# Patient Record
Sex: MALE | Race: WHITE
[De-identification: names, ages, dates, MRNs, and addresses within clinical notes are randomized per-mention and may not be internally consistent; named-entity substitution may affect disease eponyms.]

---

## 2018-08-13 ENCOUNTER — HOSPITAL ENCOUNTER (OUTPATIENT)
Dept: HOSPITAL 62 - ER | Age: 48
Setting detail: OBSERVATION
LOS: 3 days | Discharge: HOME | End: 2018-08-16
Attending: INTERNAL MEDICINE | Admitting: INTERNAL MEDICINE
Payer: MEDICARE

## 2018-08-13 DIAGNOSIS — G89.18: ICD-10-CM

## 2018-08-13 DIAGNOSIS — E66.9: ICD-10-CM

## 2018-08-13 DIAGNOSIS — R07.9: Primary | ICD-10-CM

## 2018-08-13 DIAGNOSIS — Z79.82: ICD-10-CM

## 2018-08-13 DIAGNOSIS — F41.9: ICD-10-CM

## 2018-08-13 DIAGNOSIS — Z91.19: ICD-10-CM

## 2018-08-13 DIAGNOSIS — Z95.5: ICD-10-CM

## 2018-08-13 DIAGNOSIS — R01.1: ICD-10-CM

## 2018-08-13 DIAGNOSIS — Z73.3: ICD-10-CM

## 2018-08-13 DIAGNOSIS — Z82.49: ICD-10-CM

## 2018-08-13 DIAGNOSIS — E78.5: ICD-10-CM

## 2018-08-13 DIAGNOSIS — M25.531: ICD-10-CM

## 2018-08-13 DIAGNOSIS — I10: ICD-10-CM

## 2018-08-13 DIAGNOSIS — I25.110: ICD-10-CM

## 2018-08-13 DIAGNOSIS — I25.2: ICD-10-CM

## 2018-08-13 DIAGNOSIS — Z79.899: ICD-10-CM

## 2018-08-13 DIAGNOSIS — F17.200: ICD-10-CM

## 2018-08-13 DIAGNOSIS — Z79.02: ICD-10-CM

## 2018-08-13 LAB
ADD MANUAL DIFF: NO
ALBUMIN SERPL-MCNC: 3.7 G/DL (ref 3.5–5)
ALP SERPL-CCNC: 79 U/L (ref 38–126)
ALT SERPL-CCNC: 37 U/L (ref 21–72)
ANION GAP SERPL CALC-SCNC: 12 MMOL/L (ref 5–19)
AST SERPL-CCNC: 19 U/L (ref 17–59)
BASOPHILS # BLD AUTO: 0.1 10^3/UL (ref 0–0.2)
BASOPHILS NFR BLD AUTO: 0.8 % (ref 0–2)
BILIRUB DIRECT SERPL-MCNC: 0.2 MG/DL (ref 0–0.4)
BILIRUB SERPL-MCNC: 0.5 MG/DL (ref 0.2–1.3)
BUN SERPL-MCNC: 15 MG/DL (ref 7–20)
CALCIUM: 9 MG/DL (ref 8.4–10.2)
CHLORIDE SERPL-SCNC: 105 MMOL/L (ref 98–107)
CO2 SERPL-SCNC: 25 MMOL/L (ref 22–30)
EOSINOPHIL # BLD AUTO: 0.3 10^3/UL (ref 0–0.6)
EOSINOPHIL NFR BLD AUTO: 3.3 % (ref 0–6)
ERYTHROCYTE [DISTWIDTH] IN BLOOD BY AUTOMATED COUNT: 13.8 % (ref 11.5–14)
GLUCOSE SERPL-MCNC: 106 MG/DL (ref 75–110)
HCT VFR BLD CALC: 44.1 % (ref 37.9–51)
HGB BLD-MCNC: 15.1 G/DL (ref 13.5–17)
LYMPHOCYTES # BLD AUTO: 2.6 10^3/UL (ref 0.5–4.7)
LYMPHOCYTES NFR BLD AUTO: 24.9 % (ref 13–45)
MCH RBC QN AUTO: 32.2 PG (ref 27–33.4)
MCHC RBC AUTO-ENTMCNC: 34.3 G/DL (ref 32–36)
MCV RBC AUTO: 94 FL (ref 80–97)
MONOCYTES # BLD AUTO: 1 10^3/UL (ref 0.1–1.4)
MONOCYTES NFR BLD AUTO: 9.2 % (ref 3–13)
NEUTROPHILS # BLD AUTO: 6.5 10^3/UL (ref 1.7–8.2)
NEUTS SEG NFR BLD AUTO: 61.8 % (ref 42–78)
PLATELET # BLD: 275 10^3/UL (ref 150–450)
POTASSIUM SERPL-SCNC: 3.8 MMOL/L (ref 3.6–5)
PROT SERPL-MCNC: 6.4 G/DL (ref 6.3–8.2)
RBC # BLD AUTO: 4.7 10^6/UL (ref 4.35–5.55)
SODIUM SERPL-SCNC: 141.6 MMOL/L (ref 137–145)
TOTAL CELLS COUNTED % (AUTO): 100 %
WBC # BLD AUTO: 10.5 10^3/UL (ref 4–10.5)

## 2018-08-13 PROCEDURE — 99285 EMERGENCY DEPT VISIT HI MDM: CPT

## 2018-08-13 PROCEDURE — 84484 ASSAY OF TROPONIN QUANT: CPT

## 2018-08-13 PROCEDURE — 36415 COLL VENOUS BLD VENIPUNCTURE: CPT

## 2018-08-13 PROCEDURE — G0378 HOSPITAL OBSERVATION PER HR: HCPCS

## 2018-08-13 PROCEDURE — 85025 COMPLETE CBC W/AUTO DIFF WBC: CPT

## 2018-08-13 PROCEDURE — 93005 ELECTROCARDIOGRAM TRACING: CPT

## 2018-08-13 PROCEDURE — 80053 COMPREHEN METABOLIC PANEL: CPT

## 2018-08-13 PROCEDURE — 76937 US GUIDE VASCULAR ACCESS: CPT

## 2018-08-13 PROCEDURE — 71045 X-RAY EXAM CHEST 1 VIEW: CPT

## 2018-08-13 PROCEDURE — 93010 ELECTROCARDIOGRAM REPORT: CPT

## 2018-08-13 PROCEDURE — 93458 L HRT ARTERY/VENTRICLE ANGIO: CPT

## 2018-08-13 RX ADMIN — SIMVASTATIN SCH MG: 10 TABLET, FILM COATED ORAL at 23:00

## 2018-08-13 RX ADMIN — HEPARIN SODIUM SCH UNIT: 5000 INJECTION, SOLUTION INTRAVENOUS; SUBCUTANEOUS at 23:01

## 2018-08-13 RX ADMIN — Medication SCH ML: at 23:01

## 2018-08-13 NOTE — EKG REPORT
SEVERITY:- ABNORMAL ECG -

SINUS RHYTHM

PROBABLE INFERIOR INFARCT, AGE INDETERMINATE

:

Confirmed by: Joshua Herron MD 13-Aug-2018 20:30:50

## 2018-08-13 NOTE — PDOC H&P
History of Present Illness


Admission Date/PCP: 


  08/13/18 17:38





  





Patient complains of: Chest Pain


History of Present Illness: 


ELIZABETH RECIO is a 48 year old male


Patient presents to the emergency room with complaints of chest pain.  Please 

note patient was very rude and uncooperative and the extent of my interview 

with him was limited.  Review of chart states that he has a history of coronary 

artery disease and 9 prior stents with the last one being one year ago.  Is 

currently chest pain-free





Past Medical History


Cardiac Medical History: Reports: Myocardial Infarction, Hypertension





Social History


Information Source: [facility] Records


Smoking Status: Never Smoker





- Advance Directive


Resuscitation Status: Full Code





Family History


Family History: Reviewed & Not Pertinent


Parental Family History Reviewed: No


Children Family History Reviewed: No


Sibling(s) Family History Reviewed.: No





Medication/Allergy


Home Medications: 








Aspirin [Aspirin EC] 81 mg PO DAILY 08/13/18 


Clopidogrel Bisulfate [Plavix 75 mg Tablet] 75 mg PO DAILY 08/13/18 


Isosorbide Mononitrate [Isosorbide Mononitrate ER] 30 mg PO DAILY 08/13/18 


Lisinopril [Prinivil] 20 mg PO DAILY 08/13/18 


Metoprolol Tartrate [Lopressor 25 mg Tablet] 25 mg PO DAILY 08/13/18 


Multivit-Mins/Iron/Folic/Lycop [Centrum Men's Tablet] 1 each PO DAILY 08/13/18 


Simvastatin [Zocor 20 mg Tablet] 20 mg PO QHS 08/13/18 








Allergies/Adverse Reactions: 


 





No Known Allergies Allergy (Unverified 08/13/18 18:17)


 











Review of Systems


ROS unobtainable: Other - Limited due to patient being very uncooperative and 

rude





Physical Exam


Vital Signs: 


 











Temp Pulse Resp BP Pulse Ox


 


 98.9 F      23 H  140/89 H  98 


 


 08/13/18 13:47     08/13/18 17:31  08/13/18 17:31  08/13/18 17:31











General appearance: PRESENT: no acute distress, well-developed, well-nourished


Head exam: PRESENT: atraumatic, normocephalic


Eye exam: PRESENT: conjunctiva pink, EOMI, PERRLA.  ABSENT: scleral icterus


Ear exam: PRESENT: normal external ear exam


Mouth exam: PRESENT: moist, tongue midline


Neck exam: ABSENT: carotid bruit, JVD, lymphadenopathy, thyromegaly


Respiratory exam: PRESENT: clear to auscultation sosa.  ABSENT: rales, rhonchi, 

wheezes


Cardiovascular exam: PRESENT: RRR.  ABSENT: diastolic murmur, rubs, systolic 

murmur


Pulses: PRESENT: normal dorsalis pedis pul


Vascular exam: PRESENT: normal capillary refill


GI/Abdominal exam: PRESENT: normal bowel sounds, soft.  ABSENT: distended, 

guarding, mass, organolmegaly, rebound, tenderness


Rectal exam: PRESENT: deferred


Extremities exam: PRESENT: full ROM.  ABSENT: calf tenderness, clubbing, pedal 

edema


Neurological exam: PRESENT: alert, awake, oriented to person, oriented to place

, oriented to time, oriented to situation, CN II-XII grossly intact.  ABSENT: 

motor sensory deficit


Psychiatric exam: PRESENT: appropriate affect, normal mood.  ABSENT: homicidal 

ideation, suicidal ideation


Skin exam: PRESENT: dry, intact, warm.  ABSENT: cyanosis, rash





Results


Laboratory Results: 





 





 08/13/18 13:35 





 08/13/18 15:52 





 











MCV  94 fl (80-97)   08/13/18  13:35    


 


MCH  32.2 pg (27.0-33.4)   08/13/18  13:35    


 


MCHC  34.3 g/dL (32.0-36.0)   08/13/18  13:35    


 


RDW  13.8 % (11.5-14.0)   08/13/18  13:35    


 


Seg Neutrophils %  61.8 % (42-78)   08/13/18  13:35    


 


Lymphocytes %  24.9 % (13-45)   08/13/18  13:35    


 


Monocytes %  9.2 % (3-13)   08/13/18  13:35    


 


Eosinophils %  3.3 % (0-6)   08/13/18  13:35    


 


Basophils %  0.8 % (0-2)   08/13/18  13:35    


 


Absolute Neutrophils  6.5 10^3/uL (1.7-8.2)   08/13/18  13:35    


 


Absolute Lymphocytes  2.6 10^3/uL (0.5-4.7)   08/13/18  13:35    


 


Absolute Monocytes  1.0 10^3/uL (0.1-1.4)   08/13/18  13:35    


 


Absolute Eosinophils  0.3 10^3/uL (0.0-0.6)   08/13/18  13:35    


 


Absolute Basophils  0.1 10^3/uL (0.0-0.2)   08/13/18  13:35    


 


Chloride  105 mmol/L ()   08/13/18  15:52    


 


Carbon Dioxide  25 mmol/L (22-30)   08/13/18  15:52    


 


Anion Gap  12  (5-19)   08/13/18  15:52    


 


Est GFR ( Amer)  > 60  (>60)   08/13/18  15:52    


 


Est GFR (Non-Af Amer)  > 60  (>60)   08/13/18  15:52    


 


Glucose  106 mg/dL ()   08/13/18  15:52    


 


Calcium  9.0 mg/dL (8.4-10.2)   08/13/18  15:52    


 


Total Bilirubin  0.5 mg/dL (0.2-1.3)   08/13/18  15:52    


 


AST  19 U/L (17-59)   08/13/18  15:52    


 


ALT  37 U/L (21-72)   08/13/18  15:52    


 


Alkaline Phosphatase  79 U/L ()   08/13/18  15:52    


 


Total Protein  6.4 g/dL (6.3-8.2)   08/13/18  15:52    


 


Albumin  3.7 g/dL (3.5-5.0)   08/13/18  15:52    








 











  08/13/18 08/13/18





  13:35 15:52


 


Troponin I  Cancelled  < 0.012











Impressions: 


 





Chest X-Ray  08/13/18 14:14


IMPRESSION:  NO ACUTE RADIOGRAPHIC FINDING IN THE CHEST.


 














Assessment & Plan





- Diagnosis


(1) Chest pain


Qualifiers: 


   Chest pain type: unspecified   Qualified Code(s): R07.9 - Chest pain, 

unspecified   





- Time


Time Spent: 30 to 50 Minutes


Medications reviewed and adjusted accordingly: Yes


Anticipated discharge: Home


Within: within 24 hours





- Inpatient Certification


Based on my medical assessment, after consideration of the patient's 

comorbidities, presenting symptoms, or acuity I expect that the services needed 

warrant INPATIENT care.: Yes


Medical Necessity: Need For Continuous Telemetry Monitoring





- Plan Summary


Plan Summary: 





We will schedule for stress test in a.m.
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
## 2018-08-13 NOTE — RADIOLOGY REPORT (SQ)
EXAM DESCRIPTION:  CHEST SINGLE VIEW



COMPLETED DATE/TIME:  8/13/2018 2:33 pm



REASON FOR STUDY:  chest pain



COMPARISON:  None.



EXAM PARAMETERS:  NUMBER OF VIEWS: One view.

TECHNIQUE: Single frontal radiographic view of the chest acquired.

RADIATION DOSE: NA

LIMITATIONS: None.



FINDINGS:  LUNGS AND PLEURA: No opacities, masses or pneumothorax. No pleural effusion.

MEDIASTINUM AND HILAR STRUCTURES: No masses.  Contour normal.

HEART AND VASCULAR STRUCTURES: Heart normal in size.  Normal vasculature.

BONES: No acute findings.

HARDWARE: None in the chest.

OTHER: No other significant finding.



IMPRESSION:  NO ACUTE RADIOGRAPHIC FINDING IN THE CHEST.



TECHNICAL DOCUMENTATION:  JOB ID:  [id number]

 2011 [employer]- All Rights Reserved



Reading location - IP/workstation name: [email or web address]

## 2018-08-14 RX ADMIN — Medication SCH ML: at 21:35

## 2018-08-14 RX ADMIN — HEPARIN SODIUM SCH: 5000 INJECTION, SOLUTION INTRAVENOUS; SUBCUTANEOUS at 21:35

## 2018-08-14 RX ADMIN — MORPHINE SULFATE PRN MG: 10 INJECTION INTRAMUSCULAR; INTRAVENOUS; SUBCUTANEOUS at 20:04

## 2018-08-14 RX ADMIN — HEPARIN SODIUM SCH: 5000 INJECTION, SOLUTION INTRAVENOUS; SUBCUTANEOUS at 07:20

## 2018-08-14 RX ADMIN — ISOSORBIDE MONONITRATE SCH MG: 30 TABLET, EXTENDED RELEASE ORAL at 10:28

## 2018-08-14 RX ADMIN — MORPHINE SULFATE PRN MG: 10 INJECTION INTRAMUSCULAR; INTRAVENOUS; SUBCUTANEOUS at 00:25

## 2018-08-14 RX ADMIN — MORPHINE SULFATE PRN MG: 10 INJECTION INTRAMUSCULAR; INTRAVENOUS; SUBCUTANEOUS at 17:29

## 2018-08-14 RX ADMIN — GUAIFENESIN SCH MG: 600 TABLET, EXTENDED RELEASE ORAL at 16:50

## 2018-08-14 RX ADMIN — HEPARIN SODIUM SCH: 5000 INJECTION, SOLUTION INTRAVENOUS; SUBCUTANEOUS at 15:27

## 2018-08-14 RX ADMIN — METOPROLOL TARTRATE SCH MG: 25 TABLET, FILM COATED ORAL at 10:27

## 2018-08-14 RX ADMIN — Medication SCH: at 07:20

## 2018-08-14 RX ADMIN — SIMVASTATIN SCH MG: 10 TABLET, FILM COATED ORAL at 21:33

## 2018-08-14 RX ADMIN — CLOPIDOGREL BISULFATE SCH MG: 75 TABLET, FILM COATED ORAL at 10:28

## 2018-08-14 RX ADMIN — MULTIVITAMIN TABLET SCH TAB: TABLET at 10:28

## 2018-08-14 RX ADMIN — ASPIRIN SCH MG: 81 TABLET, COATED ORAL at 10:28

## 2018-08-14 RX ADMIN — Medication SCH ML: at 16:50

## 2018-08-14 RX ADMIN — LISINOPRIL SCH MG: 10 TABLET ORAL at 10:27

## 2018-08-14 NOTE — PDOC PROGRESS REPORT
Subjective


Progress Note for:: 08/14/18


Subjective:: 


Patient was admitted for chest pain to rule out ACS.  No acute event overnight.

  Patient continued to report persistent chest pain.  However upon entering 

patient's room, patient was snoring and was noted to be sleeping.  When he 

wakes up he complained of severe chest pain rating it as 8/10 in intensity 

despite appearing comfortable.  Vision was scheduled for a chemical stress test 

this morning.  However go for a chemical stress test and said that he felt 

horrible last time he had a chemical stress test.  He did say he is able to 

area on and tolerate jogging and would prefer a treadmill stress test.





Reason For Visit: 


CHEST PAIN








Physical Exam


Vital Signs: 


 











Temp Pulse Resp BP Pulse Ox


 


 98.6 F   72   16   106/64   96 


 


 08/14/18 15:08  08/14/18 15:08  08/14/18 15:08  08/14/18 15:08  08/14/18 15:08








 Intake & Output











 08/13/18 08/14/18 08/15/18





 06:59 06:59 06:59


 


Intake Total   680


 


Balance   680


 


Weight  203 lb 0.732 oz 











General appearance: PRESENT: no acute distress, well-developed, well-nourished


Head exam: PRESENT: atraumatic, normocephalic


Eye exam: PRESENT: conjunctiva pink, EOMI, PERRLA.  ABSENT: scleral icterus


Mouth exam: PRESENT: moist, tongue midline


Respiratory exam: PRESENT: clear to auscultation sosa.  ABSENT: rales, rhonchi, 

wheezes


Cardiovascular exam: PRESENT: RRR.  ABSENT: diastolic murmur, rubs, systolic 

murmur


Vascular exam: PRESENT: normal capillary refill


GI/Abdominal exam: PRESENT: normal bowel sounds, soft.  ABSENT: distended, 

guarding, mass, organolmegaly, rebound, tenderness


Extremities exam: PRESENT: full ROM.  ABSENT: calf tenderness, clubbing, pedal 

edema


Musculoskeletal exam: PRESENT: ambulatory





Results


Laboratory Results: 


 











  08/13/18 08/14/18





  21:15 04:12


 


Troponin I  < 0.012  < 0.012











Impressions: 


 





Chest X-Ray  08/13/18 14:14


IMPRESSION:  NO ACUTE RADIOGRAPHIC FINDING IN THE CHEST.


 














Assessment & Plan





- Diagnosis


(1) Chest pain


Qualifiers: 


   Chest pain type: unspecified   Qualified Code(s): R07.9 - Chest pain, 

unspecified   


Is this a current diagnosis for this admission?: Yes   


Plan: 


EKGs and troponins have been unremarkable.  Cardiology was consulted yesterday 

on admission.  Patient will be rescheduled instead for treadmill stress test.








- Time


Time Spent with patient: 15-24 minutes

## 2018-08-14 NOTE — EKG REPORT
SEVERITY:- ABNORMAL ECG -

SINUS RHYTHM

PROBABLE INFERIOR INFARCT, AGE INDETERMINATE

:

Confirmed by: Joshua Herron MD 14-Aug-2018 07:32:55

## 2018-08-15 PROCEDURE — B2011ZZ PLAIN RADIOGRAPHY OF MULTIPLE CORONARY ARTERIES USING LOW OSMOLAR CONTRAST: ICD-10-PCS | Performed by: INTERNAL MEDICINE

## 2018-08-15 PROCEDURE — 4A023N7 MEASUREMENT OF CARDIAC SAMPLING AND PRESSURE, LEFT HEART, PERCUTANEOUS APPROACH: ICD-10-PCS | Performed by: INTERNAL MEDICINE

## 2018-08-15 PROCEDURE — B2051ZZ PLAIN RADIOGRAPHY OF LEFT HEART USING LOW OSMOLAR CONTRAST: ICD-10-PCS | Performed by: INTERNAL MEDICINE

## 2018-08-15 RX ADMIN — HEPARIN SODIUM SCH: 5000 INJECTION, SOLUTION INTRAVENOUS; SUBCUTANEOUS at 05:24

## 2018-08-15 RX ADMIN — Medication SCH ML: at 22:43

## 2018-08-15 RX ADMIN — SIMVASTATIN SCH MG: 10 TABLET, FILM COATED ORAL at 22:43

## 2018-08-15 RX ADMIN — Medication SCH: at 05:24

## 2018-08-15 RX ADMIN — RANOLAZINE SCH MG: 500 TABLET, FILM COATED, EXTENDED RELEASE ORAL at 22:46

## 2018-08-15 RX ADMIN — LISINOPRIL SCH: 10 TABLET ORAL at 10:38

## 2018-08-15 RX ADMIN — ASPIRIN SCH MG: 81 TABLET, COATED ORAL at 10:58

## 2018-08-15 RX ADMIN — Medication SCH: at 15:22

## 2018-08-15 RX ADMIN — MORPHINE SULFATE PRN MG: 10 INJECTION INTRAMUSCULAR; INTRAVENOUS; SUBCUTANEOUS at 23:16

## 2018-08-15 RX ADMIN — MULTIVITAMIN TABLET SCH: TABLET at 10:39

## 2018-08-15 RX ADMIN — HEPARIN SODIUM SCH: 5000 INJECTION, SOLUTION INTRAVENOUS; SUBCUTANEOUS at 22:42

## 2018-08-15 RX ADMIN — METOPROLOL TARTRATE SCH MG: 25 TABLET, FILM COATED ORAL at 10:58

## 2018-08-15 RX ADMIN — CLOPIDOGREL BISULFATE SCH MG: 75 TABLET, FILM COATED ORAL at 10:58

## 2018-08-15 RX ADMIN — HEPARIN SODIUM SCH: 5000 INJECTION, SOLUTION INTRAVENOUS; SUBCUTANEOUS at 15:22

## 2018-08-15 RX ADMIN — GUAIFENESIN SCH: 600 TABLET, EXTENDED RELEASE ORAL at 10:38

## 2018-08-15 RX ADMIN — ISOSORBIDE MONONITRATE SCH: 30 TABLET, EXTENDED RELEASE ORAL at 10:38

## 2018-08-15 NOTE — EKG REPORT
SEVERITY:- ABNORMAL ECG -

SINUS RHYTHM

PROBABLE INFERIOR INFARCT, AGE INDETERMINATE

:

Confirmed by: Joshua Herron MD 15-Aug-2018 13:14:31

## 2018-08-15 NOTE — PDOC PROGRESS REPORT
Subjective


Progress Note for:: 08/15/18


Subjective:: 


Patient was admitted for chest pain to rule out ACS.  No acute event overnight.

  Continues to complain of occasional chest pain.  Patient is going for a cath 

soon. Patient continues to be rude to staff and provider.





Reason For Visit: 


CHEST PAIN








Physical Exam


Vital Signs: 


 











Temp Pulse Resp BP Pulse Ox


 


 98.2 F   82   22 H  109/78   97 


 


 08/15/18 14:00  08/15/18 14:15  08/15/18 14:15  08/15/18 14:15  08/15/18 14:15








 Intake & Output











 08/14/18 08/15/18 08/16/18





 06:59 06:59 06:59


 


Intake Total  1020 


 


Balance  1020 


 


Weight 203 lb 0.732 oz 200 lb 2.876 oz 











General appearance: PRESENT: no acute distress, well-developed, well-nourished


Head exam: PRESENT: atraumatic, normocephalic


Eye exam: PRESENT: conjunctiva pink, EOMI, PERRLA.  ABSENT: scleral icterus


Ear exam: PRESENT: normal external ear exam


Neck exam: ABSENT: carotid bruit, JVD, lymphadenopathy, thyromegaly


Respiratory exam: PRESENT: clear to auscultation sosa.  ABSENT: rales, rhonchi, 

wheezes


Cardiovascular exam: PRESENT: RRR.  ABSENT: diastolic murmur, rubs, systolic 

murmur


GI/Abdominal exam: PRESENT: normal bowel sounds, soft.  ABSENT: distended, 

guarding, mass, organolmegaly, rebound, tenderness


Rectal exam: PRESENT: deferred


Neurological exam: PRESENT: alert, awake, oriented to person, oriented to place

, oriented to time, oriented to situation, CN II-XII grossly intact.  ABSENT: 

motor sensory deficit





Results


Laboratory Results: 


 











  08/13/18 08/14/18





  21:15 04:12


 


Troponin I  < 0.012  < 0.012











Impressions: 


 





Chest X-Ray  08/13/18 14:14


IMPRESSION:  NO ACUTE RADIOGRAPHIC FINDING IN THE CHEST.


 














Assessment & Plan





- Diagnosis


(1) Chest pain


Qualifiers: 


   Chest pain type: unspecified   Qualified Code(s): R07.9 - Chest pain, 

unspecified   


Is this a current diagnosis for this admission?: Yes   


Plan: 


EKGs and troponins have been unremarkable.  Awaiting cath result for further 

cardio recommendations.








- Time


Time Spent with patient: 15-24 minutes

## 2018-08-15 NOTE — OPERATIVE REPORT
Operative Report


DATE OF SURGERY: 08/15/18


PREOPERATIVE DIAGNOSIS: Unstable angina in a patient with coronary artery 

disease


POSTOPERATIVE DIAGNOSIS: Same


OPERATION: Left heart catheterization with coronary angiography and left 

ventriculography


SURGEON: SASHA GARCIA


ANESTHESIA: Moderate Sedation


TISSUE REMOVED OR ALTERED: none


COMPLICATIONS: 





No apparent complications


ESTIMATED BLOOD LOSS: None


PROCEDURE: 





Diagnostic cardiac catheterization report





Indication for procedure: 48-year-old gentleman with known coronary artery 

disease with previous PCI presenting to the hospital with unstable angina 

pectoris.  Cardiac biomarkers are unremarkable for any myocardial necrosis.  

Further workup unremarkable for any evidence of heart failure.  EKG is 

unremarkable for any ischemic change or acute injury.  The patient continues to 

have angina pectoris reminiscent of his previous symptoms at the time of his 

PCI.  He refuses stress test at this time, so he is referred for coronary 

angiography for further elucidation of his coronary anatomy.





Procedures performed:


1.  Left heart catheterization.


2.  Selective coronary arteriography.


3.  Left ventriculography.





: Sasha Garcia DO





Contrast utilized: 120 mL of Optiray 320





Procedure in brief: After informed consent was obtained, the patient was 

brought to the catheterization lab in stable condition.  Bilateral groins and 

the right wrist were prepped and draped in sterile fashion.  The patient was 

given IV moderate conscious sedation by the cardiac catheterization lab nurse 

with Versed and fentanyl which was supervised by the interventional 

cardiologist.  The following parameters were monitored: Oxygen saturation, 

heart rate, blood pressure, and response to care.  Total physician intra-

service time was 38 minutes.  





After local infiltration of the right wrist with 2% lidocaine, the right radial 

artery was punctured with a micropuncture needle under direct ultrasound 

guidance and a 6 Israeli Terumo hydrophilic sheath was inserted into the right 

radial artery using modified seldinger technique and a micropuncture kit.  The 

sheath was then flushed with a spasmolytic cocktail of nitroglycerin, verapamil

, and lidocaine.  The patient was given 4500 units of IV heparin.





Next, left heart catheterization was performed using a 6 Israeli pigtail 

diagnostic catheter to cross the aortic valve in a retrograde fashion.  Left 

ventricular pressure was measured.  A left ventriculogram was then performed in 

the HALEY projection.  The catheter was then flushed, and the catheter was 

positioned retrograde through the aortic valve and pressure measured in the 

aortic root.  Next, selective coronary arteriography was performed using a 6 

Israeli Tiger catheter to seat in the ostium the right coronary artery.  

Unfortunately, the catheter did not seat coaxially.  It was then used to seat 

in the ostium of the left main coronary artery.  Multiple injections were 

performed in orthogonal views of the catheter was removed from the body without 

difficulty.  It was exchanged for a 6 Israeli JR4 diagnostic catheter which was 

used to see the ostium of the right coronary artery.  The catheter was removed 

from the body without difficulty.





Findings:





Left heart hemodynamics


LV 97/0, LVEDP 8 millimeters mercury


Ao 95/61, mean 78 mmHg





Coronary arteriography





Left main coronary artery: The left main coronary artery is angiographically 

normal giving rise to the LAD and left circumflex arteries.





Left anterior descending artery: The left anterior descending artery courses of 

the anterior interventricular septum and crosses the cardiac apex.  There are 2 

small diagonal branch arteries which are approximately 1.5 mm in diameter.  The 

proximal LAD has minimal regularity.  The mid LAD has an eccentric 40-50% 

stenosis.  The remainder of the LAD has minimal regularity.





Left circumflex artery: The left circumflex artery has minimal regularity 

throughout.  There is one major first diagonal branch artery which has an 

eccentric 50-60% ostial stenosis.





Right coronary artery: The right coronary artery is the dominant vessel giving 

rise the right posterior descending artery.  The right coronary artery has 

minimal regularity proximally.  There is a stent in the midportion of the 

artery which has minimal in-stent restenosis.  There is also stent in the 

distal portion of the artery which extends into much of the right posterior 

descending artery.  This stented region is widely patent.  There is a small 

posterolateral artery which has no significant disease.





Left ventriculography: In the HALEY projection, the LV is of normal size.  There 

is normal LV systolic function with no regional wall motion abnormality.  There 

is no mitral regurgitation.





The right radial artery sheath was removed and hemostasis achieved with the TR 

band.  The patient tolerated the procedure well and there were no apparent 

complications at the end of the case.  





Impression:


1.  Normal left main coronary artery.


2.  40-50% mid LAD eccentric stenosis of unclear physiologic significance at 

this time.


3.  50-60% ostial first obtuse marginal branch artery stenosis of unclear 

physiologic significance at this time.


4.  Minimal regularity left circumflex artery.


5.  Previous stents in the mid and distal right coronary artery extending into 

the right posterior descending artery with no significant restenosis.


6.  No evidence of aortic stenosis.


7.  Normal LV systolic function with no regional wall motion abnormality and 

normal left ventricular end-diastolic pressure.





Plan:


1.  We will continue to further optimize the patient's medical regimen to help 

his discomfort.


2.  Further ischemic evaluation may be conducted with a treadmill cardiac 

stress test to assess the significance of the obtuse marginal branch lesion and 

the LAD lesions or the patient may transferred to a higher level center where 

FFR may be performed for the LAD and the obtuse marginal branch artery.


3.  We will discuss the results with the patient's primary cardiologist.





I appreciate the opportunity to help participate in this patient's 

cardiovascular care.  If you should have any questions for me regarding this 

patient's cardiac catheterization, please do not hesitate to contact me at the 

Critical access hospital.

## 2018-08-16 VITALS — SYSTOLIC BLOOD PRESSURE: 113 MMHG | DIASTOLIC BLOOD PRESSURE: 73 MMHG

## 2018-08-16 RX ADMIN — ASPIRIN SCH MG: 81 TABLET, COATED ORAL at 09:46

## 2018-08-16 RX ADMIN — CLOPIDOGREL BISULFATE SCH MG: 75 TABLET, FILM COATED ORAL at 09:46

## 2018-08-16 RX ADMIN — HEPARIN SODIUM SCH: 5000 INJECTION, SOLUTION INTRAVENOUS; SUBCUTANEOUS at 06:02

## 2018-08-16 RX ADMIN — RANOLAZINE SCH MG: 500 TABLET, FILM COATED, EXTENDED RELEASE ORAL at 09:49

## 2018-08-16 RX ADMIN — METOPROLOL TARTRATE SCH MG: 25 TABLET, FILM COATED ORAL at 09:46

## 2018-08-16 RX ADMIN — LISINOPRIL SCH MG: 10 TABLET ORAL at 09:40

## 2018-08-16 RX ADMIN — ISOSORBIDE MONONITRATE SCH MG: 30 TABLET, EXTENDED RELEASE ORAL at 09:41

## 2018-08-16 RX ADMIN — MULTIVITAMIN TABLET SCH TAB: TABLET at 09:41

## 2018-08-16 RX ADMIN — Medication SCH ML: at 06:02

## 2018-08-16 NOTE — CONSULTATION REPORT E
Consultation Report



NAME: ELIZABETH RECIO

MRN:  [id number]               : 1970      AGE: 48Y

DATE: 8/15/2018    531  A



TO:   BRYAN CORADO M.D.



FROM: JACLYN MURRAY,

      Requesting Physician



NOTE:  The patient briefly seen on 2018 to make sure that the

patient, who had refused IV Lexiscan and Cardiolite stress test scheduled

on the , since he wanted to do an exercise treadmill stress

Cardiolite, so I made sure that the patient could walk, and he said he has

done that before, and he seemed to be in good shape, but this morning when

he was ready for a stress test, the patient complained of chest pain and

hence, stress test was canceled.  In view of the patient's medical history

which is given below, the patient was recommended to have a cardiac

catheterization since the patient was having intermittent chest pains,

which he claims is reminiscent of the chest symptoms/pain that he had when

he had the MI and stent placements.



HISTORY OF PRESENT ILLNESS:

The patient is a 48-year-old  male who is very noncompliant and

does not seem to like to take any advice from physicians, states that he

started having chest pain on the day before admission, which is

intermittent and dull in front of the left front of the chest and

increased with exertion.  He states it was reminiscent of his MI in the

past, after which he had stents.  He states that he had 6 stents placed. 

He denies any shortness of breath, PND, orthopnea, palpitations,

dizziness, or syncope.



PAST MEDICAL HISTORY:

Positive for a history of hypertension, history of hyperlipidemia, history

of coronary artery disease, history of MI with a history of 6 stents in

the past, but the patient states the last catheterization was about a year

ago, at which time he states a stent was placed, but he does not have a

stent card and does not know the details.  He denies any symptoms of

congestive heart failure.  There are no palpitations.  There is no PND,

orthopnea, or leg edema.  It is not clear if the patient is compliant with

his medications due to financial constraints causing him to not be able to

buy his medications.



PAST SURGICAL HISTORY:

Positive for cardiac catheterization and stent placement.



FAMILY HISTORY:

Positive for coronary artery disease.



ALLERGIES:

The patient has no known allergies.



SOCIAL HISTORY:

The patient is a smoker.  There is no history of EtOH abuse.  THE PATIENT

IS A FULL CODE.  He states he has no one as the surrogate healthcare power

of  or surrogate healthcare decision maker.



MEDICATIONS:

1.   Tylenol 650 mg p.o. q. 4 hours p.r.n.

2.   Aspirin 81 mg p.o. daily.

3.   He is on Plavix 75 mg p.o. daily.

4.   He is on Flonase 1 spray nasally b.i.d. p.r.n.

5.   He is on Mucinex 600 mg p.o. x1.

6.   He is also on Mucinex 600 mg p.o. at bedtime.

7.   He is on isosorbide mononitrate 30 mg p.o. daily.

8.   He is on lisinopril 20 mg p.o. daily.

9.   He is on metoprolol tartrate 25 mg p.o. daily.

10.  He is on morphine sulfate 2 mg IV q. 2 hours p.r.n.

11.  He is on multivitamin 1 tablet p.o. daily.

12.  He is on Nicoderm patch 14 mg for 24 hours daily to chest all.

13.  He is on nitroglycerin 1 tablet sublingual p.r.n.

14.  He is on Zocor 20 mg p.o. at bedtime.



REVIEW OF SYSTEMS:

CONSTITUTIONAL:  Denies any fever, chills, or rigors.

HEAD:  Denies any headache or dizziness.

EYES:  No history of amblyopia or diplopia.  No history of amaurosis

fugax.

EARS:  No history of hearing loss.  No history of tinnitus.  No recurrent

ear infections.

NOSE:  History of nasal allergies, uses Flonase p.r.n.  No history of

nosebleeds.  No history of hay fever.  No history of nasal polyps.



MOUTH:  No altered taste sensation.  No ulcers in the mouth.  No bleeding

from the gums.

THROAT:  There is no odynophagia or dysphagia.  No recurrent sore throat.

SKIN:  No pruritus.  No yellowish discoloration of the skin.  No psoriasis

or eczema.

NECK:  No symptoms of C-spine arthritis.  No swelling in the neck.

LUNGS:  No history of asthma or COPD.  Denies any cough or sputum

production.  No history of sleep apnea.  No history of pulmonary embolism.

No history of symptoms of upper or lower respiratory tract infection.  No

pleuritic chest pain.  No hemoptysis.

CARDIAC:  History of hypertension.  History of CAD.  History of MI. 

History of multiple stents.  Recent symptoms of chest pain, which is

intermittent.  Unable to do a stress test and hence, the patient was

recommended to have a cardiac catheterization; see below.  No history of

PND, orthopnea, leg edema, congestive heart failure, or cardiac

arrhythmia.  No history of syncope.

MUSCULOSKELETAL:  Denies arthritis or collagen vascular disease.

GASTROINTESTINAL:  No history of GERD.  No history of peptic ulcer

disease.  No history of hepatitis.  No history of jaundice.  No history of

fatty food intolerance.  Appetite is fair.  No altered bowel movements. 

No history of GI bleed.

ENDOCRINE:  No history of diabetes mellitus.  No history of thyroid

disease.  No history of polydipsia or polyuria.  No history of heat or

cold intolerance.

METABOLIC:  No history of obesity.  History of hyperlipidemia present.  No

history of gout.

CENTRAL NERVOUS SYSTEM:  No history of TIA or CVA.  No history of

headaches, migraines, or seizures.

RENAL:  No history of chronic kidney disease.  No history of hematuria,

pyuria, or dysuria.  No symptoms of UTI.  No history of enlarged prostate

symptoms.

PSYCHIATRIC:  The patient states that he is under a lot of stress,

although he does not take any medication for that.  He is very anxious and

that makes him a little ill-tempered at times.  There is no depression. 

There is no suicidal or homicidal ideation.

HEMATOLOGICAL:  No history of bleeding diathesis.  No history of clotting

disorders.

VASCULAR:  No history of calf or buttock claudication.  No history of DVT.



PHYSICAL EXAMINATION:

GENERAL:  On examination, the patient is mildly obese, in no acute

distress.  He is well groomed.



VITAL SIGNS:  His temperature is 99.2 degrees Fahrenheit.  Pulse is 75

beats per minute.  Blood pressure is 112/73.  Respirations are 20 per

minute.  O2 sats are 97% on room air.



HEAD:  Atraumatic, normocephalic.



EYES:  Pupils are equal, round and regular, reactive to light and

accommodation.  Extraocular movements are normal.  There is no

conjunctival pallor.  There is no scleral icterus.



EARS:  Tympanic membranes are intact.  External auditory canals are clear.



NOSE:  There is no deviated nasal septum.  There is no inflammation of the

nasal mucous membranes.



MOUTH:  Mucous membranes of the mouth are moist.  Tongue is moist.  There

are no ulcers.  There is no bleeding from the gums.



THROAT:  There is no redness of the oropharynx.  There are no exudates.



SKIN:  There are no skin rashes.  There is no petechiae or ecchymosis. 

There are no skin lesions.



NECK:  Supple.  There is no JVD.  Carotids are equal.  There is no bruit. 

There is no lymphadenopathy.  There is no axillary muscles of respiration

use.  There is no goiter.  Trachea is central.



LUNGS:  Clear to auscultation and percussion.  There is no chest wall

tenderness.



HEART:  S1 and S2 are heard.  There is no S3 gallop.  There is no S4

gallop.  There is a systolic murmur in the left sternal border in the apex

without radiation.  There is no rub.



ABDOMEN:  Soft, nontender.  There is no hepatosplenomegaly.  Bowel sounds

are well heard.  There are no tender areas or masses.



EXTREMITIES:  Femorals are well felt.  Leg pulses well felt.  There is no

pedal edema.  There is no DVT or cellulitis.  There is no cyanosis or

clubbing.  Capillary refill is normal.  There is no calf tenderness.



CENTRAL NERVOUS SYSTEM:  The patient is conscious, awake, alert, oriented

x3 with no focal deficit.



PSYCHIATRIC:  The patient's judgment and insight are intact.  His affect

is normal.



LABORATORY DATA:

The patient's white count is 10,500.  Hemoglobin is 15.1.  Hematocrit is

44.1.  Platelet count is 275,000.  The patient's sodium is 141.6,

potassium 3.8.  Chloride is 105.  CO2 is 25.  The patient's BUN is 15,

creatinine 0.65.  GFR is greater than 60.  His glucose is 106.  His

calcium is 9.  His liver function tests are normal.  His cardiac enzymes

have been negative x3.  His albumin is 3.7.  Total protein is 6.4.  His



X-RAY STUDIES:

His chest x-ray is negative.  His EKG done on the  shows sinus rhythm,

cannot exclude old inferior wall MI.  No acute changes.  The patient's EKG

done today when he was having chest pain shows no acute changes, cannot

exclude old inferior wall MI.



IMPRESSION:

Chest pain, ongoing.  In view of that, there is a definite

contraindication with doing a stress test.  Hence, would recommend for the

patient to go to cardiac catheterization directly.  Dr. Pickens, the

interventional cardiologist from [facility], has also spoken to the patient.

The patient was given an option of that or maximizing medical therapy. 

The patient accepts cardiac catheterization.  The risks, benefits, and

alternative therapeutic options have been discussed, the risks of

_________, MI, stroke, vascular bleeding, embolic complications,

__________ allergy, and renal failure.  Conscious sedation risks and

complications have all been discussed with the patient.  The patient

states that he knows all about catheterization since he has had multiple

ones.  Continue current medications.  I will see the patient after cardiac

catheterization.







ADDENDUM:

Note:  The patient underwent cardiac catheterization.  The right coronary

artery stents are patent.  They are __________ to the right coronary

artery.  The left main is normal.  It gives rise to the circumflex and

LAD.  The LAD has a mid 40% irregular narrowing, but does not seem to be

any acute thrombus in that lesion.  The circumflex obtuse marginal 1

branch has a 50-60% narrowing at its ostium/origin.  The LV function is

normal with no wall motion abnormality.  This has been discussed with the

patient by me.  Postcatheterization, the patient complained of stabbing

pain in the right wrist, but there is good capillary refill and blood flow

in his hand.  There is no tingling or numbness.  Otherwise, his physical

exam is unchanged.



Impression:  Coronary artery disease.  At present, no definite need for

revascularization.  Discussed the options with the patient of maximizing

the medical therapy.  We will add Ranexa to his therapy.  The problem is

the patient cannot afford to buy his medication.  We will see if we can

have the  see if we can get him to get the medications. 

Also, we will see if I have any samples in the office.  Also, the other

option is to do a stress test Cardiolite to see if any of those lesions

need revascularization, or the other option is if the patient continues to

have chest pain, send him for a repeat catheterization with FFR

measurement of the lesion of the LAD and the circumflex to see if they

need revascularization percutaneously.



Note:  The patient was seen initially at 9:30 in the morning, and a total

of 60 minutes spent on this patient.  Subsequently, the patient again was

seen for about 1/2 hour postcatheterization and findings discussed. 

Medical decision making is of high complexity.  Will follow the patient in

the morning.  The patient, in view of the pain in the cath site, wants to

stay overnight, and hence, this is also I think prudent.  Will watch the

patient for any complications, which I do not foresee.  His medications

have been reviewed.  Medications adjusted.



Thanking you.



DICTATING PHYSICIAN: BRYAN CORADO M.D.





[id number]                  DT: 2018    [id number]

Y#: [id number]            DD: 2018    0047

ID:   [id number]           JOB#: [id number]      ACCT: [id number]



cc:BRYAN CORADO M.D.

>

## 2018-08-16 NOTE — PDOC DISCHARGE SUMMARY
General





- Admit/Disc Date/PCP


Admission Date/Primary Care Provider: 


  08/13/18 17:38





  





Discharge Date: 08/16/18





- Discharge Diagnosis


(1) Chest pain


Is this a current diagnosis for this admission?: Yes   





- Additional Information


Resuscitation Status: Full Code


Discharge Diet: Cardiac


Discharge Activity: Activity As Tolerated


Prescriptions: 


Aspirin [Aspirin EC] 81 mg PO DAILY 30 Days #30 tablet.


Clopidogrel Bisulfate [Plavix 75 mg Tablet] 75 mg PO DAILY 30 Days #30 tablet


Isosorbide Mononitrate [Isosorbide Mononitrate ER] 30 mg PO DAILY 30 Days #30 

tab.er.24h


Lisinopril [Prinivil] 20 mg PO DAILY 30 Days #30 tablet


Metoprolol Tartrate [Lopressor 25 mg Tablet] 25 mg PO DAILY 30 Days #30 tab


Metoprolol Tartrate [Lopressor 25 mg Tablet] 25 mg PO DAILY 30 Days #30 tablet


Ranolazine [Ranexa 500 mg Tab.sr] 500 mg PO Q12 30 Days #60 tab.sr.12h


Simvastatin [Zocor 20 mg Tablet] 20 mg PO QHS 30 Days #30 tablet


Home Medications: 








Multivit-Mins/Iron/Folic/Lycop [Centrum Men's Tablet] 1 each PO DAILY 08/13/18 


Metoprolol Tartrate [Lopressor 25 mg Tablet] 25 mg PO DAILY 30 Days #30 tab 08/

15/18 


Aspirin [Aspirin EC] 81 mg PO DAILY 30 Days #30 tablet. 08/16/18 


Clopidogrel Bisulfate [Plavix 75 mg Tablet] 75 mg PO DAILY 30 Days #30 tablet 08 /16/18 


Isosorbide Mononitrate [Isosorbide Mononitrate ER] 30 mg PO DAILY 30 Days #30 

tab.er.24h 08/16/18 


Lisinopril [Prinivil] 20 mg PO DAILY 30 Days #30 tablet 08/16/18 


Metoprolol Tartrate [Lopressor 25 mg Tablet] 25 mg PO DAILY 30 Days #30 tablet 

08/16/18 


Ranolazine [Ranexa 500 mg Tab.sr] 500 mg PO Q12 30 Days #60 tab.sr.12h 08/16/18 


Simvastatin [Zocor 20 mg Tablet] 20 mg PO QHS 30 Days #30 tablet 08/16/18 











History of Present Illness


History of Present Illness: 


EPatient presents to the emergency room with complaints of chest pain.  Please 

note patient was very rude and uncooperative and the extent of my interview 

with him was limited.  Review of chart states that he has a history of coronary 

artery disease and 9 prior stents with the last one being one year ago.  





Hospital Course


Hospital Course: 


Patient was admitted for chest pain to rule out ACS.  Serial troponins and EKGs 

have been unremarkable.  However patient complained of persistent chest pain.  

He did have history of CAD with coronary stents before.  He was initially 

scheduled for a treadmill stress test but because of his complaint of 

persistent chest pain, this was canceled.  Cardiac catheterization was pursued 

and this showed normal left main coronary artery, with 40-50% mid LAD stenosis 

and 50-60% stenosis of the first obtuse marginal branch artery.  Previous 

stents in the mid and distal right coronary artery were patent with no 

significant restenosis.  There is also normal left ventricular systolic 

function.  Ranexa was added by cardiology.  Recommendations to optimize medical 

therapy.  Patient will follow up with cardiology on an outpatient basis.








Physical Exam


Vital Signs: 


 











Temp Pulse Resp BP Pulse Ox


 


 98.2 F   77   16   113/73   97 


 


 08/16/18 10:39  08/16/18 10:39  08/16/18 10:39  08/16/18 10:39  08/16/18 10:39








 Intake & Output











 08/15/18 08/16/18 08/17/18





 06:59 06:59 06:59


 


Intake Total 1020 1230 


 


Output Total  500 


 


Balance 1020 730 


 


Weight 200 lb 2.876 oz 203 lb 11.314 oz 











General appearance: PRESENT: no acute distress, well-developed, well-nourished


Eye exam: PRESENT: conjunctiva pink, EOMI, PERRLA.  ABSENT: scleral icterus


Ear exam: PRESENT: bleeding


Neck exam: ABSENT: carotid bruit, JVD, lymphadenopathy, thyromegaly


Respiratory exam: PRESENT: clear to auscultation sosa.  ABSENT: rales, rhonchi, 

wheezes


Cardiovascular exam: PRESENT: RRR.  ABSENT: diastolic murmur, rubs, systolic 

murmur


GI/Abdominal exam: PRESENT: normal bowel sounds, soft.  ABSENT: distended, 

guarding, mass, organolmegaly, rebound, tenderness


Rectal exam: PRESENT: deferred


Extremities exam: PRESENT: full ROM.  ABSENT: calf tenderness, clubbing, pedal 

edema


Neurological exam: PRESENT: alert, awake, oriented to person, oriented to place

, oriented to time, oriented to situation, CN II-XII grossly intact.  ABSENT: 

motor sensory deficit


Psychiatric exam: PRESENT: other - patient is very rude and condescending





Results


Laboratory Results: 


 











  08/13/18 08/14/18





  21:15 04:12


 


Troponin I  < 0.012  < 0.012











Impressions: 


 





Chest X-Ray  08/13/18 14:14


IMPRESSION:  NO ACUTE RADIOGRAPHIC FINDING IN THE CHEST.


 














Qualifiers





- *


PATIENT BEING DISCHARGED WITH ANY OF THE FOLLOWING DIAGNOSIS: No

## 2018-08-17 ENCOUNTER — HOSPITAL ENCOUNTER (EMERGENCY)
Dept: HOSPITAL 62 - ER | Age: 48
Discharge: HOME | End: 2018-08-17
Payer: MEDICAID

## 2018-08-17 VITALS — DIASTOLIC BLOOD PRESSURE: 86 MMHG | SYSTOLIC BLOOD PRESSURE: 120 MMHG

## 2018-08-17 DIAGNOSIS — F17.210: ICD-10-CM

## 2018-08-17 DIAGNOSIS — R06.2: ICD-10-CM

## 2018-08-17 DIAGNOSIS — J98.01: Primary | ICD-10-CM

## 2018-08-17 DIAGNOSIS — Z98.890: ICD-10-CM

## 2018-08-17 DIAGNOSIS — R07.9: ICD-10-CM

## 2018-08-17 DIAGNOSIS — I25.2: ICD-10-CM

## 2018-08-17 DIAGNOSIS — R05: ICD-10-CM

## 2018-08-17 DIAGNOSIS — I25.10: ICD-10-CM

## 2018-08-17 DIAGNOSIS — I10: ICD-10-CM

## 2018-08-17 LAB
ADD MANUAL DIFF: NO
ALBUMIN SERPL-MCNC: 4.1 G/DL (ref 3.5–5)
ALP SERPL-CCNC: 106 U/L (ref 38–126)
ALT SERPL-CCNC: 57 U/L (ref 21–72)
ANION GAP SERPL CALC-SCNC: 13 MMOL/L (ref 5–19)
APPEARANCE UR: (no result)
APTT PPP: YELLOW S
AST SERPL-CCNC: 31 U/L (ref 17–59)
BARBITURATES UR QL SCN: NEGATIVE
BASOPHILS # BLD AUTO: 0.1 10^3/UL (ref 0–0.2)
BASOPHILS NFR BLD AUTO: 0.9 % (ref 0–2)
BILIRUB DIRECT SERPL-MCNC: 0.4 MG/DL (ref 0–0.4)
BILIRUB SERPL-MCNC: 0.4 MG/DL (ref 0.2–1.3)
BILIRUB UR QL STRIP: NEGATIVE
BUN SERPL-MCNC: 15 MG/DL (ref 7–20)
CALCIUM: 9.4 MG/DL (ref 8.4–10.2)
CHLORIDE SERPL-SCNC: 106 MMOL/L (ref 98–107)
CK MB SERPL-MCNC: 0.79 NG/ML (ref ?–4.55)
CK SERPL-CCNC: 97 U/L (ref 55–170)
CO2 SERPL-SCNC: 25 MMOL/L (ref 22–30)
EOSINOPHIL # BLD AUTO: 0.3 10^3/UL (ref 0–0.6)
EOSINOPHIL NFR BLD AUTO: 3.5 % (ref 0–6)
ERYTHROCYTE [DISTWIDTH] IN BLOOD BY AUTOMATED COUNT: 13.7 % (ref 11.5–14)
GLUCOSE SERPL-MCNC: 112 MG/DL (ref 75–110)
GLUCOSE UR STRIP-MCNC: NEGATIVE MG/DL
HCT VFR BLD CALC: 43.2 % (ref 37.9–51)
HGB BLD-MCNC: 15 G/DL (ref 13.5–17)
KETONES UR STRIP-MCNC: NEGATIVE MG/DL
LYMPHOCYTES # BLD AUTO: 2.7 10^3/UL (ref 0.5–4.7)
LYMPHOCYTES NFR BLD AUTO: 28.4 % (ref 13–45)
MCH RBC QN AUTO: 32.6 PG (ref 27–33.4)
MCHC RBC AUTO-ENTMCNC: 34.8 G/DL (ref 32–36)
MCV RBC AUTO: 94 FL (ref 80–97)
METHADONE UR QL SCN: NEGATIVE
MONOCYTES # BLD AUTO: 1 10^3/UL (ref 0.1–1.4)
MONOCYTES NFR BLD AUTO: 10 % (ref 3–13)
NEUTROPHILS # BLD AUTO: 5.5 10^3/UL (ref 1.7–8.2)
NEUTS SEG NFR BLD AUTO: 57.2 % (ref 42–78)
NITRITE UR QL STRIP: NEGATIVE
PCP UR QL SCN: NEGATIVE
PH UR STRIP: 6 [PH] (ref 5–9)
PLATELET # BLD: 268 10^3/UL (ref 150–450)
POTASSIUM SERPL-SCNC: 3.8 MMOL/L (ref 3.6–5)
PROT SERPL-MCNC: 7.2 G/DL (ref 6.3–8.2)
PROT UR STRIP-MCNC: NEGATIVE MG/DL
RBC # BLD AUTO: 4.61 10^6/UL (ref 4.35–5.55)
SODIUM SERPL-SCNC: 144.2 MMOL/L (ref 137–145)
SP GR UR STRIP: 1.02
TOTAL CELLS COUNTED % (AUTO): 100 %
TROPONIN I SERPL-MCNC: < 0.012 NG/ML
URINE AMPHETAMINES SCREEN: NEGATIVE
URINE BENZODIAZEPINES SCREEN: (no result)
URINE COCAINE SCREEN: NEGATIVE
URINE MARIJUANA (THC) SCREEN: NEGATIVE
UROBILINOGEN UR-MCNC: NEGATIVE MG/DL (ref ?–2)
WBC # BLD AUTO: 9.6 10^3/UL (ref 4–10.5)

## 2018-08-17 PROCEDURE — 94640 AIRWAY INHALATION TREATMENT: CPT

## 2018-08-17 PROCEDURE — 82553 CREATINE MB FRACTION: CPT

## 2018-08-17 PROCEDURE — 71045 X-RAY EXAM CHEST 1 VIEW: CPT

## 2018-08-17 PROCEDURE — 85025 COMPLETE CBC W/AUTO DIFF WBC: CPT

## 2018-08-17 PROCEDURE — 99285 EMERGENCY DEPT VISIT HI MDM: CPT

## 2018-08-17 PROCEDURE — 81001 URINALYSIS AUTO W/SCOPE: CPT

## 2018-08-17 PROCEDURE — 36415 COLL VENOUS BLD VENIPUNCTURE: CPT

## 2018-08-17 PROCEDURE — 80053 COMPREHEN METABOLIC PANEL: CPT

## 2018-08-17 PROCEDURE — 80307 DRUG TEST PRSMV CHEM ANLYZR: CPT

## 2018-08-17 PROCEDURE — 93005 ELECTROCARDIOGRAM TRACING: CPT

## 2018-08-17 PROCEDURE — 93010 ELECTROCARDIOGRAM REPORT: CPT

## 2018-08-17 PROCEDURE — 84484 ASSAY OF TROPONIN QUANT: CPT

## 2018-08-17 PROCEDURE — 71275 CT ANGIOGRAPHY CHEST: CPT

## 2018-08-17 PROCEDURE — 82550 ASSAY OF CK (CPK): CPT

## 2018-08-17 NOTE — EKG REPORT
SEVERITY:- BORDERLINE ECG -

SINUS RHYTHM

CONSIDER INFERIOR INFARCT

:

Confirmed by: Joshua Herron MD 17-Aug-2018 07:17:55

## 2018-08-17 NOTE — ER DOCUMENT REPORT
ED General





- General


Mode of Arrival: Ambulatory


Information source: Patient


TRAVEL OUTSIDE OF THE [place] IN LAST 30 DAYS: No





<LISSETTE AUGUSTINE - Last Filed: 08/17/18 04:04>





<LUIS M SIN - Last Filed: 08/17/18 06:11>





- General


Chief Complaint: Chest Pain


Stated Complaint: CHEST PAIN


Time Seen by Provider: 08/17/18 03:46


Notes: 


Patient is a 48-year-old male presenting to the emergency department 

complaining of chest pain onset 1 week ago.  Patient was recently seen in this 

emergency department on 8/13/2018 and admitted where  a cardiac catheterization 

was performed.  Patient was discharged yesterday but states his pain was still 

present and has worsened today.  Patient denies any exacerbating factors and 

describes his chest pain as a squeezing.  Patient states that while in the 

hospital, he developed a cough and admits to telling staff he was going for a 

walk but taking smoke breaks instead.  He denies any fevers.


 (LISSETTE AUGUSTINE)





- Related Data


Allergies/Adverse Reactions: 


 





No Known Allergies Allergy (Unverified 08/13/18 18:17)


 











Past Medical History





- General


Information source: Patient





- Social History


Smoking Status: Current Every Day Smoker


Cigarette use (# per day): Yes


Chew tobacco use (# tins/day): No


Smoking Education Provided: No


Family History: Reviewed & Not Pertinent


Patient has suicidal ideation: No


Patient has homicidal ideation: No





- Past Medical History


Cardiac Medical History: Reports: Hx Heart Attack, Hx Hypertension





<LISSETTE AUGUSTINE - Last Filed: 08/17/18 04:04>





Review of Systems





- Review of Systems


Constitutional: No symptoms reported


EENT: No symptoms reported


Cardiovascular: See HPI, Chest pain


Respiratory: See HPI, Cough


Gastrointestinal: No symptoms reported


Genitourinary: No symptoms reported


Male Genitourinary: No symptoms reported


Musculoskeletal: No symptoms reported


Skin: No symptoms reported


Hematologic/Lymphatic: No symptoms reported


Neurological/Psychological: No symptoms reported


-: Yes All other systems reviewed and negative





<LISSETTE AUGUSTINE - Last Filed: 08/17/18 04:04>





Physical Exam





- Vital signs


Interpretation: Normal





- General


General appearance: Appears well, Alert





- HEENT


Head: Normocephalic, Atraumatic


Eyes: Normal


Pupils: PERRL





- Respiratory


Respiratory status: No respiratory distress


Chest status: Nontender


Breath sounds: Wheezing


Chest palpation: Normal





- Cardiovascular


Rhythm: Regular


Heart sounds: Normal auscultation


Murmur: No





- Abdominal


Inspection: Normal


Distension: No distension


Bowel sounds: Normal


Tenderness: Nontender


Organomegaly: No organomegaly





- Back


Back: Normal, Nontender





- Extremities


General upper extremity: Normal inspection, Nontender, Normal color, Normal ROM

, Normal temperature


General lower extremity: Normal inspection, Nontender, Normal color, Normal ROM

, Normal temperature, Normal weight bearing.  No: Mona's sign





- Neurological


Neuro grossly intact: Yes


Cognition: Normal


Orientation: AAOx4


Carmen Coma Scale Eye Opening: Spontaneous


Carmen Coma Scale Verbal: Oriented


Crockett Coma Scale Motor: Obeys Commands


Crockett Coma Scale Total: 15


Speech: Normal


Motor strength normal: LUE, RUE, LLE, RLE


Sensory: Normal





- Psychological


Associated symptoms: Normal affect, Normal mood





- Skin


Skin Temperature: Warm


Skin Moisture: Dry


Skin Color: Normal





<LUIS M SIN - Last Filed: 08/17/18 06:11>





- Vital signs


Vitals: 





 











Temp Resp


 


 98.6 F   25 H


 


 08/17/18 03:48  08/17/18 03:48














Course





<LISSETTE AUGUSTINE - Last Filed: 08/17/18 04:04>





- Laboratory


Result Diagrams: 


 08/17/18 04:03





 08/17/18 04:03





- Diagnostic Test


Radiology reviewed: Reports reviewed





<LUIS M SIN - Last Filed: 08/17/18 06:11>





- Re-evaluation


Re-evalutation: 





08/17/18 06:08


Patient is a 48-year-old male who history of coronary artery disease who was 

recently seen for cardiac catheterization and no acute lesions were found.  

Patient was discharged yesterday.  He apparently was going out for smoke breaks 

while admitted to the hospital.  Patient is being seen but will not use the 

nebulizer as he says it hurts his eyes.  Patient is difficult and defensive 

with history.  When I ask him if he is feeling better he tells me that he is 

befuddled.  At this point, the patient will be discharged home as his troponin 

is negative and again had a recent cardiac catheterization.  CTA is not showing 

any evidence for pulmonary embolus.  He will be given a prescription for 

prednisone and an inhaler.  He is to follow-up up with his doctor on Monday. (

LUIS M SIN)





- Vital Signs


Vital signs: 





 











Temp Pulse Resp BP Pulse Ox


 


 98.5 F      20   120/86 H  94 


 


 08/17/18 06:01     08/17/18 06:01  08/17/18 06:01  08/17/18 06:01














- Laboratory


Laboratory results interpreted by me: 





 











  08/17/18





  04:03


 


Glucose  112 H














Discharge





<LISSETTE AUGUSTINE - Last Filed: 08/17/18 04:04>





<LUIS M SIN - Last Filed: 08/17/18 06:11>





- Discharge


Clinical Impression: 


 Bronchospasm





Chest pain


Qualifiers:


 Chest pain type: unspecified Qualified Code(s): R07.9 - Chest pain, unspecified





Condition: Stable


Disposition: HOME, SELF-CARE


Instructions:  Chest Pain of Unclear Cause ([facility]), Bronchospasm ([facility])


Prescriptions: 


Albuterol Sulfate [Proair HFA Inhalation Aerosol 8.5 gm MDI] 2 puff IH Q4H PRN #

1 mdi


 PRN Reason: 


Prednisone [Deltasone 20 mg Tablet] 3 tab PO DAILY 4 Days  tablet


Forms:  Smoking Cessation Education


Scribe Attestation: 





08/17/18 06:11


I personally performed the services described in the documentation, reviewed 

and edited the documentation which was dictated to the scribe in my presence, 

and it accurately records my words and actions. (LUIS M SIN)





Scribe Documentation





- Scribe


Written by Ari:: Ari Lemus, 8/17/2018 40:03


acting as scribe for :: Cathleen





<LISSETTE AUGUSTINE - Last Filed: 08/17/18 04:04>

## 2018-08-17 NOTE — RADIOLOGY REPORT (SQ)
EXAM DESCRIPTION:

XR CHEST 1 VIEW



COMPLETED DATE/TME:  08/17/2018 03:49



CLINICAL HISTORY:

48 years Male, CP



COMPARISON:

8/13/2018 



NUMBER OF VIEWS/TECHNIQUE:

1/AP



FINDINGS:



Adequate lung volume, clear parenchyma, normal cardiac

silhouette, and intact bony thorax.



IMPRESSION:



No acute cardiopulmonary findings.

## 2018-08-17 NOTE — RADIOLOGY REPORT (SQ)
EXAM DESCRIPTION:

CT CHEST ANGIOGRAPHY WITHOUT THEN WITH IV CONTRAST



COMPLETED DATE/TME:  08/17/2018 03:57



CLINICAL HISTORY:

48 years Male, evaluate for PE



Comparison: XR CHEST 1 VIEW  08/17/2018 03:49;NM IV LEXISCAN

CARDIOLITE (REGADENOSON)  2/16/2018 



Technique:  IV contrast.  Coronal and sagittal reformat.  3d

reconstruction.  This exam was performed according to our

departmental dose-optimization program, which includes automated

exposure control, adjustment of the mA and/or kV according to

patient size and/or use of iterative reconstruction

technique.[facility]: Dose Right [facility]: CareDose   [facility]: Dose Right   

[facility]: Teradose 4D    [facility]: [employer]



LIMITATIONS:

Quality of pulmonary arteriogram: Nondiagnostic.



Findings: 



There is inadequate evaluation of the pulmonary arterial system. 

There is no gross evidence of large pulmonary embolus.  No right

ventricular strain. Clear lungs.

Inferior neck, axillae, mediastinum, lungs, airway, lymphatics,

heart, vasculature, upper abdomen, and musculoskeleton appear

otherwise  unremarkable.



Impression:  1.  Inadequate visualization of the pulmonary

arterial system.   Consider repeat, alternative, or surveillance

interrogation for pulmonary embolus/deep venous thrombosis as

clinically warranted.

2.  No acute cardiopulmonary findings.

## 2018-08-17 NOTE — PROGRESS NOTE E
Progress Note



NAME: ELIZABETH RECIO

MRN: [id number]

: 1970             AGE: 48Y

DATE:  2018                    ROOM: 531



SUBJECTIVE:

Note that the patient denies any chest pain or discomfort.  He still has

some discomfort at the site of catheterization in the right wrist.  There

is no chest pain or discomfort.  There is no shortness of breath.  There

is no PND, orthopnea.  There is no leg edema.  There is no arrhythmia

seen.  There is no DVT or cellulitis.  There is no pedal edema.  There is

no syncope or near syncope.



OBJECTIVE:

GENERAL:  The patient is well built, but mildly obese, but well groomed.



VITAL SIGNS:  He is afebrile with a temperature of 98.2 degrees

Fahrenheit.  His pulse is 77 beats per minute, blood pressure is 116/90,

respirations are 16 per minute, O2 sats are 97% on room air.



HEAD:  Atraumatic, normocephalic.



EYES:  Pupils are equal, round, regular, reactive to light and

accommodation.  Extraocular movements are normal.  There is no

conjunctival pallor.  There is no scleral icterus.



ENT:  Negative.



NECK:  Supple.  There is no JVD.  Carotids are equal.  There is no bruit. 

There is no lymphadenopathy.  There is no goiter.  Trachea is central.



LUNGS:  Clear to auscultation and percussion.



CARDIOVASCULAR:  S1 and S2 are heard.  There is no S3 gallop.  There is no

S4 gallop.  There is a systolic murmur in the left sternal border of the

apex.  There is no rub.



ABDOMEN:  Soft, nontender.  There is no hepatosplenomegaly.  Bowel sounds

are well heard.



EXTREMITIES:  Femorals are well left.  Leg pulses are well felt.  There is

no pedal edema.  There is no DVT or cellulitis.  There is no calf

tenderness.  Note that the calf site is fine with good circulation in the

right hand.  There is no hematoma at the site of the right wrist.



CENTRAL NERVOUS SYSTEM:  The patient is conscious, awake, alert, oriented

x3 with no focal deficit.



PSYCHIATRIC:  The patient's judgment and insight are intact.  His affect

is normal.



IMPRESSION:

1.  CORONARY ARTERY DISEASE, LESIONS OF THE LAD AND OM BRANCH, DOES NOT

APPEAR TO BE SIGNIFICANT, BUT NEEDS LATER EXERCISE TREADMILL CARDIOLITE TO

DEFINE ISCHEMIA PRESENCE OR ABSENCE.

2.  HYPERTENSION.

3.  HYPERLIPIDEMIA.

4.  TOBACCO ABUSE.



RECOMMENDATIONS:

Will add Ranexa to the patient's medications.  Continue aspirin, Plavix,

and his Lopressor and current medications.  Will follow the patient in the

outpatient.  The patient has my cell number to call me if he needs me. 

Medications have been reviewed.  Medications added.  NOTE:  Medical

decision making is of moderate to high complexity.



Thirty five minutes spent on the patient with more than 50% of the time

spent in direct patient care.  Will follow the patient as outpatient. 

Will sign off the case.





DICTATING PHYSICIAN:  BRYAN CORADO M.D.





[id number]                  DT: 2018    [id number]

KRYSTLE#: [id number]            DD: 2018    0048

ID:   [id number]           JOB#: [id number]       ACCT: [id number]



cc:

>